# Patient Record
Sex: MALE | Race: WHITE | NOT HISPANIC OR LATINO | Employment: FULL TIME | URBAN - METROPOLITAN AREA
[De-identification: names, ages, dates, MRNs, and addresses within clinical notes are randomized per-mention and may not be internally consistent; named-entity substitution may affect disease eponyms.]

---

## 2024-04-01 ENCOUNTER — OFFICE VISIT (OUTPATIENT)
Dept: OTOLARYNGOLOGY | Facility: CLINIC | Age: 74
End: 2024-04-01
Payer: MEDICARE

## 2024-04-01 VITALS
TEMPERATURE: 97.5 F | DIASTOLIC BLOOD PRESSURE: 55 MMHG | WEIGHT: 155.6 LBS | BODY MASS INDEX: 22.28 KG/M2 | HEIGHT: 70 IN | SYSTOLIC BLOOD PRESSURE: 175 MMHG

## 2024-04-01 DIAGNOSIS — H90.3 BILATERAL SENSORINEURAL HEARING LOSS: Primary | ICD-10-CM

## 2024-04-01 DIAGNOSIS — H93.13 TINNITUS OF BOTH EARS: ICD-10-CM

## 2024-04-01 DIAGNOSIS — H61.21 IMPACTED CERUMEN OF RIGHT EAR: ICD-10-CM

## 2024-04-01 PROCEDURE — 69210 REMOVE IMPACTED EAR WAX UNI: CPT | Performed by: OTOLARYNGOLOGY

## 2024-04-01 PROCEDURE — 1160F RVW MEDS BY RX/DR IN RCRD: CPT | Performed by: OTOLARYNGOLOGY

## 2024-04-01 PROCEDURE — 1159F MED LIST DOCD IN RCRD: CPT | Performed by: OTOLARYNGOLOGY

## 2024-04-01 PROCEDURE — 99204 OFFICE O/P NEW MOD 45 MIN: CPT | Performed by: OTOLARYNGOLOGY

## 2024-04-01 PROCEDURE — 1036F TOBACCO NON-USER: CPT | Performed by: OTOLARYNGOLOGY

## 2024-04-01 RX ORDER — LISINOPRIL 20 MG/1
20 TABLET ORAL
COMMUNITY

## 2024-04-01 RX ORDER — OFLOXACIN 3 MG/ML
SOLUTION AURICULAR (OTIC)
COMMUNITY
Start: 2024-03-29

## 2024-04-01 RX ORDER — WARFARIN SODIUM 5 MG/1
1 TABLET ORAL
COMMUNITY
Start: 2007-04-05

## 2024-04-01 RX ORDER — NAPROXEN SODIUM 220 MG/1
81 TABLET, FILM COATED ORAL
COMMUNITY

## 2024-04-01 RX ORDER — AMLODIPINE BESYLATE 5 MG/1
5 TABLET ORAL
COMMUNITY

## 2024-04-01 RX ORDER — CICLOPIROX 80 MG/ML
SOLUTION TOPICAL
COMMUNITY
Start: 2021-02-25

## 2024-04-01 RX ORDER — LORAZEPAM 1 MG/1
1 TABLET ORAL
COMMUNITY
Start: 2007-04-05

## 2024-04-01 RX ORDER — FINASTERIDE 5 MG/1
TABLET, FILM COATED ORAL
COMMUNITY
Start: 2021-02-10

## 2024-04-01 RX ORDER — TERAZOSIN 5 MG/1
CAPSULE ORAL
COMMUNITY
Start: 2007-04-05

## 2024-04-01 RX ORDER — AMMONIUM LACTATE 12 G/100G
LOTION TOPICAL
COMMUNITY
Start: 2020-10-07

## 2024-04-01 RX ORDER — ALOGLIPTIN 25 MG/1
1 TABLET, FILM COATED ORAL DAILY
COMMUNITY
Start: 2021-02-10

## 2024-04-01 RX ORDER — METOPROLOL TARTRATE 25 MG/1
TABLET, FILM COATED ORAL
COMMUNITY
Start: 2007-04-05

## 2024-04-01 RX ORDER — SILDENAFIL 100 MG/1
TABLET, FILM COATED ORAL
COMMUNITY
Start: 2021-02-10

## 2024-04-01 RX ORDER — WARFARIN 2.5 MG/1
1 TABLET ORAL
COMMUNITY
Start: 2007-03-29

## 2024-04-01 RX ORDER — GLIMEPIRIDE 2 MG/1
TABLET ORAL
COMMUNITY
Start: 2020-05-21

## 2024-04-01 RX ORDER — AMOXICILLIN AND CLAVULANATE POTASSIUM 875; 125 MG/1; MG/1
1 TABLET, FILM COATED ORAL 2 TIMES DAILY
COMMUNITY
Start: 2024-03-17 | End: 2024-03-27

## 2024-04-01 RX ORDER — CLOPIDOGREL BISULFATE 75 MG/1
1 TABLET ORAL DAILY
COMMUNITY
Start: 2007-04-05

## 2024-04-01 RX ORDER — ATORVASTATIN CALCIUM 80 MG/1
80 TABLET, FILM COATED ORAL
COMMUNITY

## 2024-04-01 NOTE — PROGRESS NOTES
Impression:  1. Bilateral sensorineural hearing loss        2. Tinnitus of both ears        3. Impacted cerumen of right ear             RECOMMENDATIONS/PLAN :  Using the operative microscope and suction I was able to remove all of his impacted cerumen from that right ear.  He can stop the eardrops for now.  It appears that his previous perforation has healed.  He must keep all water out of his ears.  We discussed various masking techniques to help cover up the ringing in his ears and he will continue to protect his hearing from any future loud noise exposure.      **This electronic medical record note was created with the use of voice recognition software.  Despite proofreading, typographical or grammatical errors may be present that could affect meaning of content **    Subjective   Patient ID:     Carter Schaefer is a 73 y.o. male who presents to the office today after he recently had drainage and fullness with pressure in the right ear.  The patient has a known history of hearing loss and ringing in the ears.  He denies any vertigo or neurologic complaints.  In the past he has had a perforation in that right ear.    ROS:  A detailed 12 system review of systems is noted on the intake form has been reviewed with the patient with details noted in the HPI and scanned into the patient's medical record.    Objective     History reviewed. No pertinent past medical history.     History reviewed. No pertinent surgical history.     Allergies   Allergen Reactions    Penicillin Hives          Current Outpatient Medications:     alogliptin (Nesina) 25 mg tablet, Take 1 tablet (25 mg) by mouth once daily., Disp: , Rfl:     amLODIPine (Norvasc) 5 mg tablet, Take 1 tablet (5 mg) by mouth once daily., Disp: , Rfl:     ammonium lactate (Lac-Hydrin) 12 % lotion, APPLY A SUFFICIENT AMOUNT TO FEET EXTERNALLY EVERY DAY, Disp: , Rfl:     aspirin 81 mg chewable tablet, Chew 1 tablet (81 mg) once daily., Disp: , Rfl:     atorvastatin  "(Lipitor) 80 mg tablet, Take 1 tablet (80 mg) by mouth once daily., Disp: , Rfl:     ciclopirox (Penlac) 8 % solution, Apply topically once daily., Disp: , Rfl:     clopidogrel (Plavix) 75 mg tablet, Take 1 tablet (75 mg) by mouth once daily., Disp: , Rfl:     finasteride (Proscar) 5 mg tablet, TAKE ONE TABLET BY MOUTH EVERY DAY (DO NOT CRUSH) FOR PROSTATE, Disp: , Rfl:     glimepiride (Amaryl) 2 mg tablet, TAKE 1 TABLET BY MOUTH with breakfast and 1 tablet with dinner, Disp: , Rfl:     lisinopril 20 mg tablet, Take 1 tablet (20 mg) by mouth once daily., Disp: , Rfl:     LORazepam (Ativan) 1 mg tablet, Take 1 tablet (1 mg) by mouth., Disp: , Rfl:     metoprolol tartrate (Lopressor) 25 mg tablet, Take by mouth., Disp: , Rfl:     ofloxacin (Floxin) 0.3 % otic solution, Apply 10 Drops in the affected ear once daily for 7 days, Disp: , Rfl:     sildenafil (Viagra) 100 mg tablet, TAKE ONE TABLET BY MOUTH AN HOUR_BEFORE SEX. (NO MORE THAN 1 DOSE PER 24 HOURS) NO NITRATES, Disp: , Rfl:     terazosin (Hytrin) 5 mg capsule, Take by mouth., Disp: , Rfl:     warfarin (Coumadin) 2.5 mg tablet, Take 1 tablet (2.5 mg) by mouth., Disp: , Rfl:     warfarin (Coumadin) 5 mg tablet, Take 1 tablet (5 mg) by mouth., Disp: , Rfl:      Tobacco Use: Low Risk  (4/1/2024)    Patient History     Smoking Tobacco Use: Never     Smokeless Tobacco Use: Never     Passive Exposure: Not on file        Alcohol Use: Not on file        Social History     Substance and Sexual Activity   Drug Use Not on file        Physical Exam:  Visit Vitals  /55   Temp 36.4 °C (97.5 °F) (Temporal)   Ht 1.778 m (5' 10\")   Wt 70.6 kg (155 lb 9.6 oz)   BMI 22.33 kg/m²   Smoking Status Never   BSA 1.87 m²      General: Patient is alert, oriented, cooperative in no apparent distress.  Head: Normocephalic, atraumatic.  Eyes: PERRL, EOMI, Conjunctiva is clear. No nystagmus.  Ears: Right Ear-- Pinna is normal.  External auditory canal is occluded with cerumen.  Using " the operative microscope and suction I was able to remove this and he was feeling better.. Tympanic membrane is intact and it appears that he had a previous perforation that has healed.  No effusion..  Mastoid is nontender.  Left ear-- Pinna is normal.  External auditory canal is patent. Tympanic membrane is [intact, translucent and has good mobility with my pneumatic otoscope.  No effusion].  Mastoid is nontender.  Nose: Septum is straight.  No septal perforation or lesions. No septal hematoma/ seroma.  No signs of bleeding.  Inferior turbinates are mildly swollen.   No evidence of intranasal polyps.  No infectious drainage.  Throat:  Floor of mouth is clear, no masses.  Tongue appears normal, no lesions or masses. Gums, gingiva, buccal mucosa appear pink and moist, no lesions. Teeth are in fair repair.  No obvious dental infections.  Peritonsillar regions appear symmetric without swelling.  Hard and soft palate appear normal, no obvious cleft. Uvula is midline.  Oropharynx: No lesions. Retropharyngeal wall is flat.  No active postnasal drip.  Neck: Supple,  no lymphadenopathy.  No masses.  Salivary Glands: Symmetric bilaterally.  No palpable masses.  No evidence of acute infection or salivary stones  Neurologic: Cranial Nerves 2-12 are grossly intact without focal deficits. Cerebellar function testing is normal.     Results:   I reviewed his previous audiogram and he does have a mild to moderate high-frequency sensorineural loss in both ears.  Word recognition scores were 90% bilaterally.  Speech reception threshold was 10 dB bilaterally.    Procedure:   After informed consent was obtained with the risks, benefits, complications, and alternatives explained to the patient / guardian, the patient was laid back in the ENT chair and the operative microscope was brought to the right ear.  A speculum was placed and using the operative microscope and/or curette/ suction, I was able to carefully remove all of the impacted  cerumen that was causing pain/ hearing loss.  After doing so, the patient was feeling much better and had much less pain.  The TM was [intact, translucent and had good mobility with my pneumatic otoscope].  The patient tolerated the procedure well and there were no complications.      Sameer Aviles, DO